# Patient Record
Sex: FEMALE | NOT HISPANIC OR LATINO | URBAN - METROPOLITAN AREA
[De-identification: names, ages, dates, MRNs, and addresses within clinical notes are randomized per-mention and may not be internally consistent; named-entity substitution may affect disease eponyms.]

---

## 2017-02-15 ENCOUNTER — IMPORTED ENCOUNTER (OUTPATIENT)
Dept: URBAN - METROPOLITAN AREA CLINIC 1 | Facility: CLINIC | Age: 62
End: 2017-02-15

## 2017-02-15 PROBLEM — Z96.1: Noted: 2017-02-15

## 2017-02-15 PROBLEM — H43.812: Noted: 2017-02-15

## 2017-02-15 PROBLEM — H40.013: Noted: 2017-02-15

## 2017-02-15 PROCEDURE — 92133 CPTRZD OPH DX IMG PST SGM ON: CPT

## 2017-02-15 PROCEDURE — 92015 DETERMINE REFRACTIVE STATE: CPT

## 2017-02-15 PROCEDURE — 92083 EXTENDED VISUAL FIELD XM: CPT

## 2017-02-15 PROCEDURE — 92014 COMPRE OPH EXAM EST PT 1/>: CPT

## 2017-02-15 NOTE — PATIENT DISCUSSION
1. COAG Suspect OU: (0.35/0.50)  IOP was 18 OU. Condition was discussed with patient. Will monitor patient for progression. OCT/VF was ordered and done today results was normal OU. 2. Pseudophakia OU - Observe 3. 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 4.  Return for an appointment in 1 year for 30 and OCT. with Dr. Donna Lennon.

## 2022-04-02 ASSESSMENT — VISUAL ACUITY
OS_CC: 20/30-2
OD_CC: 20/30-2

## 2022-04-02 ASSESSMENT — TONOMETRY
OS_IOP_MMHG: 18
OD_IOP_MMHG: 18